# Patient Record
Sex: MALE | Race: WHITE | NOT HISPANIC OR LATINO | Employment: OTHER | URBAN - METROPOLITAN AREA
[De-identification: names, ages, dates, MRNs, and addresses within clinical notes are randomized per-mention and may not be internally consistent; named-entity substitution may affect disease eponyms.]

---

## 2023-03-07 ENCOUNTER — TRANSFERRED RECORDS (OUTPATIENT)
Dept: HEALTH INFORMATION MANAGEMENT | Facility: CLINIC | Age: 63
End: 2023-03-07

## 2024-04-05 ENCOUNTER — TRANSFERRED RECORDS (OUTPATIENT)
Dept: HEALTH INFORMATION MANAGEMENT | Facility: CLINIC | Age: 64
End: 2024-04-05
Payer: COMMERCIAL

## 2024-04-22 ENCOUNTER — MEDICAL CORRESPONDENCE (OUTPATIENT)
Dept: HEALTH INFORMATION MANAGEMENT | Facility: CLINIC | Age: 64
End: 2024-04-22
Payer: COMMERCIAL

## 2024-04-23 ENCOUNTER — TRANSCRIBE ORDERS (OUTPATIENT)
Dept: OTHER | Age: 64
End: 2024-04-23

## 2024-04-23 DIAGNOSIS — G20.A1 PARKINSON'S DISEASE WITHOUT DYSKINESIA OR FLUCTUATING MANIFESTATIONS (H): Primary | ICD-10-CM

## 2024-04-25 NOTE — TELEPHONE ENCOUNTER
RECORDS RECEIVED FROM: external   REASON FOR VISIT: DBS consultation for PD    PROVIDER: Dr. Sven Torres   DATE OF APPT: 5/9/24   NOTES (FOR ALL VISITS) STATUS DETAILS   OFFICE NOTE from referring provider Received Dr Yung Erickson @ [facility]:  4/5/24 2/28/23   OFFICE NOTE from other specialist Received Naveen GARCIA @ [facility]:  10/4/23  4/4/23   MEDICATION LIST Received    IMAGING  (FOR ALL VISITS)     MRI (HEAD, NECK, SPINE) PACS [facility]:  MRI Brain 3/7/23

## 2024-05-08 NOTE — PROGRESS NOTES
Department of Neurology  Movement Disorders Division   New Patient Visit    Patient: Yamil Smith   MRN: [id number]   : 1960   Date of Visit: 2024    CC: Parkinson's disease, DBS referral    HPI:  Yamil Smith is a 64 year old right handed man who presents for consideration of DBS for Parkinson's disease.     Mr. Smith presents with his son, Felix.     He reports that the last time he saw his Parkinson's doctor (Dr. Erickson with [facility]), Dr. Erickson told him that he would be a good candidate for an implant. He has looked into different treatment options including focused ultrasound and is interested in learning more in general. He does not remember why Dr. Erickson felt he would be a good candidate for DBS.     Mr. Smith was diagnosed with Parkinson's disease a little over a year ago. He got insurance with Medicare a year ago. He had some check ups and asked why his hand seemed to be shaky at times and he was referred to Dr. Erickson. He does not remember when his hand started shaking - if he had to guess he would say maybe a couple of years. Tremor is in his right hand - he maybe notices it in his left hand if it's cold out. He does not notice tremor elsewhere in his body - face, voice, legs, head.     Parkinson Disease Motor Symptom Review:  Dyskinesia: Denies  Wearing off:  Prescribed CD/LD TID - currently really only taking 1 tab at 7am, 1 tab at 3pm. Tremor seems better controlled in the morning. He doesn't clearly notice wearing off in the afternoon as he tends to be working then.   Freezing of gait: Yes, occasionally. Maybe more with turning - can come out of nowhere.   Dystonia: Rare toe curling (maybe more cramping in the left leg - toe curling hasn't happened in a long time). Rare right finger curling - none recently.   Tremor: Right hand tremor - more at rest. Comes and goes.   Rigidity: Legs get stiff and sore if he is active - crouching a lot. Hitting the gym has helped with this. He has not  "noticed any asymmetry in stiffness.   Bradykinesia: A neighbor's wife mentioned he seemed to be moving in slow motion a couple of years ago. He has seen some videos of himself where he appears to be moving quite slowly (making a video for his mom). Dexterity is a little off. Handwriting is a little worse - maybe sometimes a little smaller.      Parkinson's Disease Non-motor Symptom Review:  Depression - Mood is generally ok - son agrees. Denies feeling down or anxious.   Anxiety - Occasional work stress, nothing on a day to day.   Cognitive impairment -  He is starting to get more forgetful - he attributes this to his age. His son has no specific concerns about cognition - he is moving, talking and perhaps thinking slower. He denies any functional impairment from cognitive issues.   Sleep disturbances - \"Seems fine.\" No known dream enactment behavior currently. There have been a couple of times in the past he has maybe acted out a bad dream - not in the past couple of years.   GI symptoms - Denies constipation  Urinary symptoms - Sometimes urinary urgency  Balance - \"So-so.\" No falls. He has to be close to somewhere he can hold himself up when lifting legs to put on pants. He has not worked with physical therapy. He was going to go to [facility] Parkinson's disease at the [facility].   Autonomic dysfunction - Occasionally dizziness with quick positional changes - not happening every time he stands. He has also noticed that sometimes when it is cold out, he breaks into a cold sweat.   Hallucinations - Denies hallucinations. Sometimes sees shadows out of the corner of his eyes but typically this is attributable to car driving by or the band for his glasses.   Speech - He has been told that he talks more softly - first pointed him out to him by Dr. Erickson. His son reports that Mr. Smith being a soft talker seems normal to him, he hasn't noticed a change in his father's voice. Mr. Smith has also not noticed a change in " "voice.   Swallowing - Denies dysphagia.   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   Dopamine dysregulation  - Denies any problems with impulse control. Not taking medications excessively.      Driving: Yes - \"My driving isn't the greatest.\" Wife was glad that his son brought him today for rush hour. He attributes to vision issues (he wears reading glasses and has issues with far vision).  Living situation: Wife and son, Felix. Friend lives in the basement.   Medication adherence - frequently misses bedtime dose of CD/LD  ADL's: the patient is independent    He is working as a . 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 His son's wife works for [employer] and deals a lot with the implants.     He does not believe there is any history of Parkinson's disease in the family.     Chews tobacco, no smoking  No alcohol currently - none in the past 6 years. Prior heavier consumption.   No marijuana, THC, CBD  No other drug use.     Related Medications 7am 3pm 9pm   Carbidopa/Levodopa  mg 1 1 1 sometimes   Last taken at this morning around 6:30am.     He denies any side effects from CD/LD.     He has never taken a higher dose of CD/LD. He has only ever been on CD/LD for Parkinson's disease.     ROS:  All others negative except as listed above.    No past medical history on file.     No past surgical history on file.     No current outpatient medications on file.       Not on File     No family history on file.     Social History     Socioeconomic History    Marital status:      Spouse name: Not on file    Number of children: Not on file    Years of education: Not on file    Highest education level: Not on file   Occupational History    Not on file   Tobacco Use    Smoking status: Not on file    Smokeless tobacco: Not on file   Substance and Sexual Activity    Alcohol use: Not on file    Drug use: Not on file    Sexual activity: Not on file "   Other Topics Concern    Not on file   Social History Narrative    Not on file     Social Determinants of Health     Financial Resource Strain: Not on file   Food Insecurity: Not on file   Transportation Needs: Not on file   Physical Activity: Not on file   Stress: Not on file   Social Connections: Not on file   Interpersonal Safety: Not on file   Housing Stability: Not on file        PHYSICAL EXAM:  BP (!) 167/89 (BP Location: Right arm, Patient Position: Sitting, Cuff Size: Adult Large)   Pulse 70   Wt 89.4 kg (197 lb)   SpO2 97%     Gen: alert, active, attentive, appropriately groomed   HEENT: normocephalic, eyes open with no discharge, nares patent, oropharynx clear-no lesions  Chest: normal configuration, chest rise equal b/l, non labored breathing   CV: warm and well perfused  Pulmonary: normal WOB and conversational on RA  Extremities: no clubbing/edema/cyanosis in BUE/BLE  Psych: mood stable     NEURO:  MS: Alert and oriented to person, place, time, and situation.  Speech normal to comprehension and naming.  Recent and remote memory intact.  Attention and concentration normal.  Fund of knowledge normal.    CN:  II: Pupils equal, round, and reactive to light. VFF.  III, IV, VI: EOM normal range, no nystagmus.    V:  Facial sensation intact.  VII: Face symmetric at rest and with activation  VIII: Intact to voice  IX, X: Palate rise b/l, uvula midline.  Soft spoken, no dysarthria.  XI: Trapezius 5/5 bilaterally.  XII: Tongue protrudes midline. No fasciculation or atrophy noted.    Motor:  Normal bulk throughout upper and lower extremities.  See UPDRS for full details of motor assessment.   R/L  Shoulder abd      5/5  Elbow flex  5/5  Elbow ext  5/5     5/5  IO   5/5    Hip flex  5/5  Knee flex  5/5  Knee ext  5/5  Dorsiflex  5/5  Plantar flex  5/5    Reflexes:  R/L  Biceps   2+/2+  Brachioradialis 2+/2+  Patellar  2+/2+  Achilles  1+/1+  Plantar   down/down   No clonus.  No frontal release  signs.    Sensation:  Intact to LT and vibration in all extremities.    Coordination:  FTN and HTN intact bilaterally.    Gait:  Reduced arm swing on right, slightly wide based. En bloc turn. Normal retropulsion.         5/9/2024    10:00 AM   UPDRS Motor Scale   Time: 10:14   Medication On   R Brain DBS: None   L Brain DBS: None   Dyskinesia (LID) No   Did LID interfere No   Speech 1   Facial Expression 2   Rigidity Neck 0   Rigidity RUE 1   Rigidity LUE 1   Rigidity RLE 0   Rigidity LLE 1   Finger Taps R 1   Finger Taps L 1   Hand Mvt R 2   Hand Mvt L 1   Pron-/Supinate R 0   Pron-/Supinate L 0   Toe Tap R 1   Toe Tap L 0   Leg Agility R 0   Leg Agility L 0   Arise From Chair 0   Gait 1   Gait Freezing 0   Postural Stability 0   Posture 3   Global Spont Mvt 2   Postural Tremor RUE 0   Postural Tremor LUE 1   Kinetic Tremor RUE 0   Kinetic Tremor LUE 0   Rest Tremor RUE 2   Rest Tremor LUE 0   Rest Tremor RLE 0   Rest Tremor LLE 0   Rest Tremor Lip/Jaw 0   Rest Tremor Constancy 1   Total Right 7   Total Left 5   Axial Total 9   Total 22       LABS:  CBC with mild thrombocytopenia (115)  BMP unremarkable  HA1C 5.3  TSH 0.71  , HLD 74    IMAGING:    MRI Brain March 2023  Mild cerebral atrophy. The intracranial contents are otherwise negative.   Slight ethmoid paranasal sinus inflammatory change.   - Personally reviewed, agree with interpretation above    ASSESSMENT/PLAN:  Yamil Smith is a 64 year old right handed man who presents for consideration of DBS for Parkinson's disease.         - Reviewed Parkinson's disease  - Reviewed how to take Carbidopa/Levodopa - continue 1 tab Carbidopa/Levodopa TID    - Recommend shortening the interval between doses and taking three doses consistently  - There is room to increase CD/LD as needed  - Advised that at this time, he is too early into his disease course for surgical treatment of Parkinson's disease   - Reviewed deep brain stimulation, focused ultrasound, gamma  knife radiation  - Reviewed the importance of exercise in Parkinson's disease    - Provided list of Parkinson's disease specific exercise resources  - Provided link to PD GENEration    Follow up with Dr. Erickson per patient preference.     Mr. Smith was seen and discussed with neurology attending, Dr. Singer.     Geovanna Schaeffer MD  Movement Disorders Fellow

## 2024-05-09 ENCOUNTER — OFFICE VISIT (OUTPATIENT)
Dept: NEUROLOGY | Facility: CLINIC | Age: 64
End: 2024-05-09
Attending: PSYCHIATRY & NEUROLOGY
Payer: COMMERCIAL

## 2024-05-09 ENCOUNTER — PRE VISIT (OUTPATIENT)
Dept: NEUROLOGY | Facility: CLINIC | Age: 64
End: 2024-05-09

## 2024-05-09 VITALS
HEART RATE: 70 BPM | DIASTOLIC BLOOD PRESSURE: 89 MMHG | SYSTOLIC BLOOD PRESSURE: 167 MMHG | OXYGEN SATURATION: 97 % | WEIGHT: 197 LBS

## 2024-05-09 DIAGNOSIS — G20.A1 PARKINSON'S DISEASE WITHOUT DYSKINESIA OR FLUCTUATING MANIFESTATIONS (H): Primary | ICD-10-CM

## 2024-05-09 PROCEDURE — 99205 OFFICE O/P NEW HI 60 MIN: CPT | Mod: GC | Performed by: STUDENT IN AN ORGANIZED HEALTH CARE EDUCATION/TRAINING PROGRAM

## 2024-05-09 RX ORDER — CARBIDOPA AND LEVODOPA 25; 100 MG/1; MG/1
1 TABLET ORAL 3 TIMES DAILY
COMMUNITY

## 2024-05-09 RX ORDER — MULTIVITAMIN,THER AND MINERALS
1 TABLET ORAL DAILY
COMMUNITY

## 2024-05-09 RX ORDER — ACETAMINOPHEN 500 MG
1000 TABLET ORAL EVERY 6 HOURS PRN
COMMUNITY

## 2024-05-09 ASSESSMENT — UNIFIED PARKINSONS DISEASE RATING SCALE (UPDRS)
FREEZING_GAIT: (0) NORMAL: NO FREEZING.
SPONTANEITY_OF_MOVEMENT: (2) MILD: MILD GLOBAL SLOWNESS AND POVERTY OF SPONTANEOUS MOVEMENTS.
TOTAL_SCORE: 7
PRONATION_SUPINATION_LEFT: (0) NORMAL: NO PROBLEMS.
FACIAL_EXPRESSION: (2) MILD: IN ADDITION TO DECREASED EYE-BLINK FREQUENCY, MASKED FACIES PRESENT IN THE LOWER FACE AS WELL, NAMELY FEWER MOVEMENTS AROUND THE MOUTH, SUCH AS LESS SPONTANEOUS SMILING, BUT LIPS NOT PARTED.
RIGIDITY_RLE: (0) NORMAL: NO RIGIDITY.
GAIT: (1) SLIGHT: INDEPENDENT WALKING WITH MINOR GAIT IMPAIRMENT.
TOETAPPING_RIGHT: (1) SLIGHT: ANY OF THE FOLLOWING: A) THE REGULAR RHYTHM IS BROKEN WITH ONE WITH ONE OR TWO INTERRUPTIONS OR HESITATIONS OF THE MOVEMENT  B) SLIGHT SLOWING  C) THE AMPLITUDE DECREMENTS NEAR THE END OF THE 10 MOVEMENTS.
TOTAL_SCORE_LEFT: 5
RIGIDITY_NECK: (0) NORMAL: NO RIGIDITY.
POSTURE: (3) MODERATE: STOOPED POSTURE, SCOLIOSIS, OR LEANING TO ONE SIDE THAT CANNOT BE CORRECTED VOLITIONALLY TO A NORMAL POSTURE BY THE PATIENT.
RIGIDITY_LUE: (1) SLIGHT: RIGIDITY ONLY DETECTED WITH ACTIVATION MANEUVER.
AMPLITUDE_LIP_JAW: (0) NORMAL: NO TREMOR.
POSTURAL_STABILITY: (0) NORMAL: NO PROBLEMS. RECOVERS WITH ONE OR TWO STEPS.
MOVEMENTS_INTERFERE_WITH_RATINGS: NO
PRONATION_SUPINATION_RIGHT: (0) NORMAL: NO PROBLEMS.
LEG_AGILITY_RIGHT: (0) NORMAL: NO PROBLEMS.
RIGIDITY_RUE: (1) SLIGHT: RIGIDITY ONLY DETECTED WITH ACTIVATION MANEUVER.
AXIAL_SCORE: 9
TOETAPPING_LEFT: (0) NORMAL: NO PROBLEMS.
FINGER_TAPPING_LEFT: (1) SLIGHT: ANY OF THE FOLLOWING: A) THE REGULAR RHYTHM IS BROKEN WITH ONE WITH ONE OR TWO INTERRUPTIONS OR HESITATIONS OF THE MOVEMENT  B) SLIGHT SLOWING  C) THE AMPLITUDE DECREMENTS NEAR THE END OF THE 10 MOVEMENTS.
CONSTANCY_TREMOR_ATREST: (1) SLIGHT: TREMOR AT REST IS PRESENT 25% OF THE ENTIRE EXAMINATION PERIOD.
AMPLITUDE_RLE: (0) NORMAL: NO TREMOR.
RIGIDITY_LLE: (1) SLIGHT: RIGIDITY ONLY DETECTED WITH ACTIVATION MANEUVER.
TOTAL_SCORE: 22
PARKINSONS_MEDS: ON
ARISING_CHAIR: (0) NORMAL: NO PROBLEMS. ABLE TO ARISE QUICKLY WITHOUT HESITATION.
HANDMOVEMENTS_LEFT: (1) SLIGHT: ANY OF THE FOLLOWING: A) THE REGULAR RHYTHM IS BROKEN WITH ONE WITH ONE OR TWO INTERRUPTIONS OR HESITATIONS OF THE MOVEMENT  B) SLIGHT SLOWING  C) THE AMPLITUDE DECREMENTS NEAR THE END OF THE 10 MOVEMENTS.
FINGER_TAPPING_RIGHT: (1) SLIGHT: ANY OF THE FOLLOWING: A) THE REGULAR RHYTHM IS BROKEN WITH ONE WITH ONE OR TWO INTERRUPTIONS OR HESITATIONS OF THE MOVEMENT  B) SLIGHT SLOWING  C) THE AMPLITUDE DECREMENTS NEAR THE END OF THE 10 MOVEMENTS.
AMPLITUDE_RUE: (2) MILD: > 1 CM BUT < 3 CM IN MAXIMAL AMPLITUDE.
AMPLITUDE_LUE: (0) NORMAL: NO TREMOR.
DYSKINESIAS_PRESENT: NO
LEG_AGILITY_LEFT: (0) NORMAL: NO PROBLEMS.
SPEECH: (1) SLIGHT: LOSS OF MODULATION, DICTION OR VOLUME, BUT STILL ALL WORDS EASY TO UNDERSTAND.
HANDMOVEMENTS_RIGHT: (2) MILD: ANY OF THE FOLLOWING: A) 3 TO 5 INTERRUPTIONS DURING TAPPING  B) MILD SLOWING  C) THE AMPLITUDE DECREMENTS MIDWAY IN THE 10-MOVEMENT SEQUENCE.
AMPLITUDE_LLE: (0) NORMAL: NO TREMOR.

## 2024-05-09 NOTE — PATIENT INSTRUCTIONS
We agree with the diagnosis of Parkinson's disease. You are on a low dose of Carbidopa/Levodopa and your exam looks good today. We expect Carbidopa/Levodopa to last in your body around 4-5 hours. If you go longer between doses, you may notice wearing off of symptoms. We generally recommend starting this medication as three times a day - usually in the morning, around lunch and around dinner. Taking this medication more consistently may help improve your symptoms. Especially early in the disease, people often don't need the medication overnight. If Parkinson's disease symptoms start to interfere with symptoms, there are options to add at bedtime.     We recommend taking your 1 tablet of Carbidopa/Levodopa three times a day. If you are ok with how your symptoms are controlled currently, you do not need to change the timing. However, you may benefit from taking 1 tablet before breakfast, lunch and dinner to get it more consistently in your system. There is room to increase the amount of medication with each dose if you feel that your symptoms are not controlled well enough.     We consider surgical treatment (Deep Brain Stimulation, focus ultrasound, gamma knife radiotherapy) when people have Parkinson's disease symptoms that are not controlled well enough on medications or who have side effects from medication. At this time, there are other changes that we would recommend making before considering surgical treatment. These are options to consider in the future if needed.     Exercise is very important for Parkinson's disease. There have been studies that show that exercise helps slow and improve symptoms of Parkinson's disease. Aerobic exercise is especially important. We have provided a list of exercise resources for folks with Parkinson's disease below.     In general, what is good for the heart is good for the brain. Eating a healthy diet (Mediterranean - lots of fruits and vegetables, limiting red meat) is  generally recommended to help prevent wear and tear over time. There is no specific diet we recommend for Parkinson's disease we recommend.     Parkinson's disease genetic project: PDGENEration  https://www.parkinson.org/advancing-research/our-research/pdgeneration    You can follow up with Dr. Erickson. Please reach out to our clinic with any questions or concerns in the future.     Parkinson's Disease Exercise Resources:    Katherine Chapman exercise class and support group (currently online): [phone] or [email or web address].    Https://www.E-[email or web address]/    Https://www.[email or web address]    https://www.apdaparkinson.org/community/[place]/local-resources-support/    Https://www.apdaparWHI Solutionon.org/community/[place]/    Https://www.ocf5ntqs.org/    PMD [place] Exercise Groups by [facility]  https://www.pmdalliance.org/resources/exercise-groups/[place]/    The Boxing club: Knock out Parkinson's boxing program    [place] - [phone]  [facility] - [phone]  [facility] [phone]  [place] - [phone]    [employer]  [email or web address]://[email or web address]-directory  Call [phone]    [employer]  https://tools.[employer]/    [employer] Listing of Events  https://www.apdaparkinson.org/upcoming-events/?eType=EmailBlastContent&bAr=[email or web address]    [employer] Events  https://[email or web address]/events/    Dance for Parkinsons  https://danceforLifeshare [email or web address]/resources/dance-at-home    Seated Strength and Cardio  Https://www.iRewindube.com/channel/JM27V3XpwRugEUECse7nQOue/feed    Sathya Chi  Https://www.iRewindube.com/channel/SD49L8JgwNkuIQEWtr6yLFfp/feed    Seated Exercises for Parkinson's  https://www.workingonwellnessfoundation.org/  Monday is Yoga. Tuesday is General Exercise. Wednesday is Strength Training. Thursday is Range-of-Motion.    Call the Parkinson's Foundation Helpline 6.797.4PD.INFO (1-453.476.7510) for  exercise resources in your area.     Free ebook about PD

## 2024-05-09 NOTE — LETTER
2024       RE: Yamil Smith  [address]  [facility] [address]     Dear Colleague,    Thank you for referring your patient, aYmil Smith, to the [facility]UROLOG[facility] [place] at [facility]. Please see a copy of my visit note below.    Department of Neurology  Movement Disorders Division   New Patient Visit    Patient: Yamil Smith   MRN: [id number]   : 1960   Date of Visit: 2024    CC: Parkinson's disease, DBS referral    HPI:  Yamil Smith is a 64 year old right handed man who presents for consideration of DBS for Parkinson's disease.     Mr. Smith presents with his son, Felix.     He reports that the last time he saw his Parkinson's doctor (Dr. Erickson with [facility]), Dr. Erickson told him that he would be a good candidate for an implant. He has looked into different treatment options including focused ultrasound and is interested in learning more in general. He does not remember why Dr. Erickson felt he would be a good candidate for DBS.     Mr. Smith was diagnosed with Parkinson's disease a little over a year ago. He got insurance with Medicare a year ago. He had some check ups and asked why his hand seemed to be shaky at times and he was referred to Dr. Erickson. He does not remember when his hand started shaking - if he had to guess he would say maybe a couple of years. Tremor is in his right hand - he maybe notices it in his left hand if it's cold out. He does not notice tremor elsewhere in his body - face, voice, legs, head.     Parkinson Disease Motor Symptom Review:  Dyskinesia: Denies  Wearing off:  Prescribed CD/LD TID - currently really only taking 1 tab at 7am, 1 tab at 3pm. Tremor seems better controlled in the morning. He doesn't clearly notice wearing off in the afternoon as he tends to be working then.   Freezing of gait: Yes, occasionally. Maybe more with turning - can come out of nowhere.   Dystonia: Rare  "toe curling (maybe more cramping in the left leg - toe curling hasn't happened in a long time). Rare right finger curling - none recently.   Tremor: Right hand tremor - more at rest. Comes and goes.   Rigidity: Legs get stiff and sore if he is active - crouching a lot. Hitting the gym has helped with this. He has not noticed any asymmetry in stiffness.   Bradykinesia: A neighbor's wife mentioned he seemed to be moving in slow motion a couple of years ago. He has seen some videos of himself where he appears to be moving quite slowly (making a video for his mom). Dexterity is a little off. Handwriting is a little worse - maybe sometimes a little smaller.      Parkinson's Disease Non-motor Symptom Review:  Depression - Mood is generally ok - son agrees. Denies feeling down or anxious.   Anxiety - Occasional work stress, nothing on a day to day.   Cognitive impairment -  He is starting to get more forgetful - he attributes this to his age. His son has no specific concerns about cognition - he is moving, talking and perhaps thinking slower. He denies any functional impairment from cognitive issues.   Sleep disturbances - \"Seems fine.\" No known dream enactment behavior currently. There have been a couple of times in the past he has maybe acted out a bad dream - not in the past couple of years.   GI symptoms - Denies constipation  Urinary symptoms - Sometimes urinary urgency  Balance - \"So-so.\" No falls. He has to be close to somewhere he can hold himself up when lifting legs to put on pants. He has not worked with physical therapy. He was going to go to [facility]'s [facility] at the [facility].   Autonomic dysfunction - Occasionally dizziness with quick positional changes - not happening every time he stands. He has also noticed that sometimes when it is cold out, he breaks into a cold sweat.   Hallucinations - Denies hallucinations. Sometimes sees shadows out of the corner of his eyes but typically this is " "attributable to car driving by or the band for his glasses.   Speech - He has been told that he talks more softly - first pointed him out to him by Dr. Erickson. His son reports that Mr. Smith being a soft talker seems normal to him, he hasn't noticed a change in his father's voice. Mr. Smith has also not noticed a change in voice.   Swallowing - Denies dysphagia.   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   Dopamine dysregulation  - Denies any problems with impulse control. Not taking medications excessively.      Driving: Yes - \"My driving isn't the greatest.\" Wife was glad that his son brought him today for rush hour. He attributes to vision issues (he wears reading glasses and has issues with far vision).  Living situation: Wife and son, Felix. Friend lives in the basement.   Medication adherence - frequently misses bedtime dose of CD/LD  ADL's: the patient is independent    He is working as a . 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 His son's wife works for [employer] and deals a lot with the implants.     He does not believe there is any history of Parkinson's disease in the family.     Chews tobacco, no smoking  No alcohol currently - none in the past 6 years. Prior heavier consumption.   No marijuana, THC, CBD  No other drug use.     Related Medications 7am 3pm 9pm   Carbidopa/Levodopa  mg 1 1 1 sometimes   Last taken at this morning around 6:30am.     He denies any side effects from CD/LD.     He has never taken a higher dose of CD/LD. He has only ever been on CD/LD for Parkinson's disease.     ROS:  All others negative except as listed above.    No past medical history on file.     No past surgical history on file.     No current outpatient medications on file.       Not on File     No family history on file.     Social History     Socioeconomic History    Marital status:      Spouse name: Not on file    Number of " children: Not on file    Years of education: Not on file    Highest education level: Not on file   Occupational History    Not on file   Tobacco Use    Smoking status: Not on file    Smokeless tobacco: Not on file   Substance and Sexual Activity    Alcohol use: Not on file    Drug use: Not on file    Sexual activity: Not on file   Other Topics Concern    Not on file   Social History Narrative    Not on file     Social Determinants of Health     Financial Resource Strain: Not on file   Food Insecurity: Not on file   Transportation Needs: Not on file   Physical Activity: Not on file   Stress: Not on file   Social Connections: Not on file   Interpersonal Safety: Not on file   Housing Stability: Not on file        PHYSICAL EXAM:  BP (!) 167/89 (BP Location: Right arm, Patient Position: Sitting, Cuff Size: Adult Large)   Pulse 70   Wt 89.4 kg (197 lb)   SpO2 97%     Gen: alert, active, attentive, appropriately groomed   HEENT: normocephalic, eyes open with no discharge, nares patent, oropharynx clear-no lesions  Chest: normal configuration, chest rise equal b/l, non labored breathing   CV: warm and well perfused  Pulmonary: normal WOB and conversational on RA  Extremities: no clubbing/edema/cyanosis in BUE/BLE  Psych: mood stable     NEURO:  MS: Alert and oriented to person, place, time, and situation.  Speech normal to comprehension and naming.  Recent and remote memory intact.  Attention and concentration normal.  Fund of knowledge normal.    CN:  II: Pupils equal, round, and reactive to light. VFF.  III, IV, VI: EOM normal range, no nystagmus.    V:  Facial sensation intact.  VII: Face symmetric at rest and with activation  VIII: Intact to voice  IX, X: Palate rise b/l, uvula midline.  Soft spoken, no dysarthria.  XI: Trapezius 5/5 bilaterally.  XII: Tongue protrudes midline. No fasciculation or atrophy noted.    Motor:  Normal bulk throughout upper and lower extremities.  See UPDRS for full details of motor  assessment.   R/L  Shoulder abd      5/5  Elbow flex  5/5  Elbow ext  5/5     5/5  IO   5/5    Hip flex  5/5  Knee flex  5/5  Knee ext  5/5  Dorsiflex  5/5  Plantar flex  5/5    Reflexes:  R/L  Biceps   2+/2+  Brachioradialis 2+/2+  Patellar  2+/2+  Achilles  1+/1+  Plantar   down/down   No clonus.  No frontal release signs.    Sensation:  Intact to LT and vibration in all extremities.    Coordination:  FTN and HTN intact bilaterally.    Gait:  Reduced arm swing on right, slightly wide based. En bloc turn. Normal retropulsion.         5/9/2024    10:00 AM   UPDRS Motor Scale   Time: 10:14   Medication On   R Brain DBS: None   L Brain DBS: None   Dyskinesia (LID) No   Did LID interfere No   Speech 1   Facial Expression 2   Rigidity Neck 0   Rigidity RUE 1   Rigidity LUE 1   Rigidity RLE 0   Rigidity LLE 1   Finger Taps R 1   Finger Taps L 1   Hand Mvt R 2   Hand Mvt L 1   Pron-/Supinate R 0   Pron-/Supinate L 0   Toe Tap R 1   Toe Tap L 0   Leg Agility R 0   Leg Agility L 0   Arise From Chair 0   Gait 1   Gait Freezing 0   Postural Stability 0   Posture 3   Global Spont Mvt 2   Postural Tremor RUE 0   Postural Tremor LUE 1   Kinetic Tremor RUE 0   Kinetic Tremor LUE 0   Rest Tremor RUE 2   Rest Tremor LUE 0   Rest Tremor RLE 0   Rest Tremor LLE 0   Rest Tremor Lip/Jaw 0   Rest Tremor Constancy 1   Total Right 7   Total Left 5   Axial Total 9   Total 22     LABS:  CBC with mild thrombocytopenia (115)  BMP unremarkable  HA1C 5.3  TSH 0.71  , HLD 74    IMAGING:    MRI Brain March 2023  Mild cerebral atrophy. The intracranial contents are otherwise negative.   Slight ethmoid paranasal sinus inflammatory change.   - Personally reviewed, agree with interpretation above    ASSESSMENT/PLAN:  Yamil Smith is a 64 year old right handed man who presents for consideration of DBS for Parkinson's disease.     - Reviewed Parkinson's disease  - Reviewed how to take Carbidopa/Levodopa - continue 1 tab  Carbidopa/Levodopa TID    - Recommend shortening the interval between doses and taking three doses consistently  - There is room to increase CD/LD as needed  - Advised that at this time, he is too early into his disease course for surgical treatment of Parkinson's disease   - Reviewed deep brain stimulation, focused ultrasound, gamma knife radiation  - Reviewed the importance of exercise in Parkinson's disease    - Provided list of Parkinson's disease specific exercise resources  - Provided link to PD GENEration    Follow up with Dr. Erickson per patient preference.     Mr. Smith was seen and discussed with neurology attending, Dr. Singer.     Geovanna Schaeffer MD  Movement Disorders Fellow      Again, thank you for allowing me to participate in the care of your patient.      Sincerely,    Sven Torres MD

## 2024-11-05 ENCOUNTER — TELEPHONE (OUTPATIENT)
Dept: NEUROLOGY | Facility: CLINIC | Age: 64
End: 2024-11-05
Payer: COMMERCIAL

## 2024-11-05 NOTE — TELEPHONE ENCOUNTER
The patient was called and offered 11/15/24 at 8:30 AM but the patient declined this. The patient requested for an appointment on 12/26/24 with Dr. Singer. The patient was scheduled on 12/26/24 at 8 AM with Dr. Singer, to follow up on regarding Deep Brain Stimulation. The patient is aware this appointment is in [place], at the [facility], 3rd Floor.

## 2024-11-05 NOTE — TELEPHONE ENCOUNTER
[facility] Call Center    Phone Message    May a detailed message be left on voicemail: yes     Reason for Call: Other:       Patient calling to schedule follow up with Dr Yg Torres for DBS consult.   Writer unable to schedule return movement disorder appointment with Dr Yg Torres as no template was available.   Sending TE to clinic to review and assist with scheduling, call back #[phone]    Action Taken: Message routed to:  [facility] ([facility] Neurology    Travel Screening: Not Applicable

## 2024-12-20 NOTE — PROGRESS NOTES
Department of Neurology  Movement Disorders Division  Follow-up Patient Visit    Patient: Yamil Smith  MRN: [id number]  : 1960  Date of Visit: 2024  PCP: Richard Ohara  Referring Provider: Yung Erickson MD  [facility]UROLOG[facility]  [address] [place] [id number]    CC: Parkinson's Disease    HPI:  Yamil Smith is a 64yr old R-handed male who presents for follow-up of Parkinson's Disease. He is accompanied by his wife and son    Symptom onset onset was a couple of years ago when the patient noticed a tremor in his R hand. He went onto Medicare in  and was thereafter seen by Dr Erickson at [facility] where he was ultimately diagnosed with Parkinson's Disease. He was then referred to [facility] in 2024 for consideration of DBS, though at that initial appointment he was thought to be too early in his disease course to warrant brain surgery     INTERVAL EVENTS:  The patient was last seen on 2024, at which time the patient was endorsing fairly good control of his PD symptoms and so continued on his CD/LD (25-100mg) 1 tab TID regimen. Initial plan was for continued follow-up with his prior physician - Dr Erickson at [facility] - but he returns to clinic today for ongoing management    Today, the patient reports he feels his symptoms have been getting a bit worse. He describes this as stiffness and pain in his legs. He also feels that his walking has gotten worse - more shuffling - with daily gait freezing. He has also started falling, falling down ~4x in a 6wk period about 1mo ago. Most of these are due to freezing. He's been trying to compensate for this by taking his time when starting his walking. He does not use a walking assist device    The patient reports that he has been noticing wearing off of his medications at around the 4hr richard. When he's OFF, he feels his legs are more stiff and his tremor will also get worse    Tremor in the bilateral (R>L) hand that is somewhat aggravated by  cold    Hx of toe curling, but is quite intermittent. May have rare R finger curling as well and feels that his legs often get stiff. This is improved by increased activity. He was previously in [facility] for [facility]'s [facility] at the [facility]    Voice has been getting softer, but still easily understandable    Sleep is ok, but is interrupted by nocturia. Does have dream enactment which has been present for many years. Occurs ~1-2x per mo    Has also been noticing issues with temperature control, saying that he will sometimes not sweat as much in hot places and also will have cold sweats when it dips below freezing. He will occasionally get dizzy upon standing    Feels that he is more forgetful but no more overt concerns from patient or family. No concern for hallucinations    MEDICATIONS:  Pertinent Movement Medications   7AM 3PM 9PM    CD/LD (25-100mg) 1 1 1                       PHYSICAL EXAM:  BP (!) 180/92 (BP Location: Right arm, Patient Position: Sitting, Cuff Size: Adult Large)   Pulse 79   Wt 93 kg (205 lb)   SpO2 99%         5/9/2024    10:00 AM 12/26/2024     8:00 AM   UPDRS Motor Scale   Time: 10:14 08:52   Medication On On   R Brain DBS: None None   L Brain DBS: None None   Dyskinesia (LID) No No   Did LID interfere No    Speech 1 1   Facial Expression 2 2   Rigidity Neck 0 0   Rigidity RUE 1 1   Rigidity LUE 1 1   Rigidity RLE 0 1   Rigidity LLE 1 1   Finger Taps R 1 1   Finger Taps L 1 1   Hand Mvt R 2 1   Hand Mvt L 1 1   Pron-/Supinate R 0 1   Pron-/Supinate L 0 1   Toe Tap R 1 0   Toe Tap L 0 0   Leg Agility R 0 0   Leg Agility L 0 0   Arise From Chair 0 0   Gait 1 0   Gait Freezing 0 1   Postural Stability 0 0   Posture 3 1   Global Spont Mvt 2 1   Postural Tremor RUE 0 0   Postural Tremor LUE 1 0   Kinetic Tremor RUE 0 0   Kinetic Tremor LUE 0 0   Rest Tremor RUE 2 1   Rest Tremor LUE 0 0   Rest Tremor RLE 0 0   Rest Tremor LLE 0 0   Rest Tremor Lip/Jaw 0 0   Rest Tremor Constancy 1 1   Total  Right 7 6   Total Left 5 5   Axial Total 9 6   Total 22 18       DATA:  MRI Brain (3/7/2023)  IMPRESSION:  Mild cerebral atrophy. The intracranial contents are otherwise negative.   Slight ethmoid paranasal sinus inflammatory change.    ASSESSMENT:  Yamil Smith is a 64yr old R-handed male who presents for follow-up of Parkinson's Disease. Today he is reporting issues mostly with wearing off - around 4hrs after his last dose manifesting with stiffness and some tremor - and also gait freezing. It is unclear if the latter is responsive to CD/LD or not. Still, I would recommend increasing the frequency of the CD/LD dose to Q4h and payign more attention to which symptoms are improved with CD/LD and which are not. I also recommend that he begin PT for his gait and balance. I also provided information to the patient about Orthostatic Hypotension and recommend that his family keep a close eye on both dizziness as well as dream enactment behavior    PLAN:  - Increase Carbidopa/Levodopa 1 tab four times per day (ie every 4 hours). It will look like this:  New Meds 7AM 11AM 3PM 7PM   CD/LD (25-100mg) 1 1 1  1   - PT referral to work on walking/balance  - Try to pay close attention to which of your symptoms respond to Carbidopa/Levodopa and which do not  - Aerobic Exercises are very important for Parkinson's Disease. I would recommend that you try and exercise on a daily basis   - Let us know via a [employer] message if you'd be interested in the PD Exercise groups (ex Rock Steady Boxing, Ping-Pong for Parkinson's, etc) and we can send you some information. Or, you should be able to find it online  - Continue to pay close attention to acting out your dreams at night as this could be a sign of REM Sleep Behavior Disorder (RBD). If this gets more frequent or severe, let us know and we may start to treat  - Continue to pay close attention to dizziness. I've provided some information on your AVS, but keep us updated as well  - I  have also given you information on constipation should you need it  - Follow-up in 6mo    This patient was seen with Movement Disorder Specialist, Dr Singer, who agrees with the above plan    Janeth Jett MD  Movement Disorder Fellow

## 2024-12-26 ENCOUNTER — OFFICE VISIT (OUTPATIENT)
Dept: NEUROLOGY | Facility: CLINIC | Age: 64
End: 2024-12-26
Payer: COMMERCIAL

## 2024-12-26 VITALS
OXYGEN SATURATION: 99 % | DIASTOLIC BLOOD PRESSURE: 92 MMHG | SYSTOLIC BLOOD PRESSURE: 180 MMHG | WEIGHT: 205 LBS | HEART RATE: 79 BPM

## 2024-12-26 DIAGNOSIS — G20.A1 PARKINSON'S DISEASE WITHOUT DYSKINESIA OR FLUCTUATING MANIFESTATIONS (H): Primary | ICD-10-CM

## 2024-12-26 PROCEDURE — G2211 COMPLEX E/M VISIT ADD ON: HCPCS | Performed by: PSYCHIATRY & NEUROLOGY

## 2024-12-26 PROCEDURE — 99215 OFFICE O/P EST HI 40 MIN: CPT | Mod: GC | Performed by: PSYCHIATRY & NEUROLOGY

## 2024-12-26 RX ORDER — CARBIDOPA AND LEVODOPA 25; 100 MG/1; MG/1
1 TABLET ORAL
Qty: 450 TABLET | Refills: 3 | Status: SHIPPED | OUTPATIENT
Start: 2024-12-26 | End: 2025-12-26

## 2024-12-26 ASSESSMENT — UNIFIED PARKINSONS DISEASE RATING SCALE (UPDRS)
RIGIDITY_LLE: (1) SLIGHT: RIGIDITY ONLY DETECTED WITH ACTIVATION MANEUVER.
GAIT: (0) NORMAL: NO PROBLEMS.
SPEECH: (1) SLIGHT: LOSS OF MODULATION, DICTION OR VOLUME, BUT STILL ALL WORDS EASY TO UNDERSTAND.
AMPLITUDE_LLE: (0) NORMAL: NO TREMOR.
AMPLITUDE_LUE: (0) NORMAL: NO TREMOR.
SPONTANEITY_OF_MOVEMENT: (1) SLIGHT: SLIGHT GLOBAL SLOWNESS AND POVERTY OF SPONTANEOUS MOVEMENTS.
HANDMOVEMENTS_LEFT: (1) SLIGHT: ANY OF THE FOLLOWING: A) THE REGULAR RHYTHM IS BROKEN WITH ONE WITH ONE OR TWO INTERRUPTIONS OR HESITATIONS OF THE MOVEMENT  B) SLIGHT SLOWING  C) THE AMPLITUDE DECREMENTS NEAR THE END OF THE 10 MOVEMENTS.
TOTAL_SCORE: 18
FACIAL_EXPRESSION: (2) MILD: IN ADDITION TO DECREASED EYE-BLINK FREQUENCY, MASKED FACIES PRESENT IN THE LOWER FACE AS WELL, NAMELY FEWER MOVEMENTS AROUND THE MOUTH, SUCH AS LESS SPONTANEOUS SMILING, BUT LIPS NOT PARTED.
FREEZING_GAIT: (1) SLIGHT: FREEZES ON STARTING, TURNING, OR WALKING THROUGH DOORWAY WITH A SINGLE HALT DURING ANY OF THESE EVENTS, BUT THEN CONTINUES SMOOTHLY WITHOUT FREEZING DURING STRAIGHT WALKING.
FINGER_TAPPING_LEFT: (1) SLIGHT: ANY OF THE FOLLOWING: A) THE REGULAR RHYTHM IS BROKEN WITH ONE WITH ONE OR TWO INTERRUPTIONS OR HESITATIONS OF THE MOVEMENT  B) SLIGHT SLOWING  C) THE AMPLITUDE DECREMENTS NEAR THE END OF THE 10 MOVEMENTS.
PARKINSONS_MEDS: ON
PRONATION_SUPINATION_LEFT: (1) SLIGHT: ANY OF THE FOLLOWING: A) THE REGULAR RHYTHM IS BROKEN WITH ONE WITH ONE OR TWO INTERRUPTIONS OR HESITATIONS OF THE MOVEMENT  B) SLIGHT SLOWING  C) THE AMPLITUDE DECREMENTS NEAR THE END OF THE 10 MOVEMENTS.
RIGIDITY_NECK: (0) NORMAL: NO RIGIDITY.
DYSKINESIAS_PRESENT: NO
AMPLITUDE_LIP_JAW: (0) NORMAL: NO TREMOR.
TOTAL_SCORE_LEFT: 5
PRONATION_SUPINATION_RIGHT: (1) SLIGHT: ANY OF THE FOLLOWING: A) THE REGULAR RHYTHM IS BROKEN WITH ONE WITH ONE OR TWO INTERRUPTIONS OR HESITATIONS OF THE MOVEMENT  B) SLIGHT SLOWING  C) THE AMPLITUDE DECREMENTS NEAR THE END OF THE 10 MOVEMENTS.
TOETAPPING_LEFT: (0) NORMAL: NO PROBLEMS.
ARISING_CHAIR: (0) NORMAL: NO PROBLEMS. ABLE TO ARISE QUICKLY WITHOUT HESITATION.
TOTAL_SCORE: 6
RIGIDITY_RLE: (1) SLIGHT: RIGIDITY ONLY DETECTED WITH ACTIVATION MANEUVER.
POSTURE: (1) SLIGHT: NOT QUITE ERECT, BUT COULD BE NORMAL FOR OLDER PERSONS.
POSTURAL_STABILITY: (0) NORMAL: NO PROBLEMS. RECOVERS WITH ONE OR TWO STEPS.
RIGIDITY_LUE: (1) SLIGHT: RIGIDITY ONLY DETECTED WITH ACTIVATION MANEUVER.
LEG_AGILITY_RIGHT: (0) NORMAL: NO PROBLEMS.
TOETAPPING_RIGHT: (0) NORMAL: NO PROBLEMS.
AMPLITUDE_RLE: (0) NORMAL: NO TREMOR.
AMPLITUDE_RUE: (1) SLIGHT: < 1 CM IN MAXIMAL AMPLITUDE.
RIGIDITY_RUE: (1) SLIGHT: RIGIDITY ONLY DETECTED WITH ACTIVATION MANEUVER.
FINGER_TAPPING_RIGHT: (1) SLIGHT: ANY OF THE FOLLOWING: A) THE REGULAR RHYTHM IS BROKEN WITH ONE WITH ONE OR TWO INTERRUPTIONS OR HESITATIONS OF THE MOVEMENT  B) SLIGHT SLOWING  C) THE AMPLITUDE DECREMENTS NEAR THE END OF THE 10 MOVEMENTS.
CONSTANCY_TREMOR_ATREST: (1) SLIGHT: TREMOR AT REST IS PRESENT 25% OF THE ENTIRE EXAMINATION PERIOD.
HANDMOVEMENTS_RIGHT: (1) SLIGHT: ANY OF THE FOLLOWING: A) THE REGULAR RHYTHM IS BROKEN WITH ONE WITH ONE OR TWO INTERRUPTIONS OR HESITATIONS OF THE MOVEMENT  B) SLIGHT SLOWING  C) THE AMPLITUDE DECREMENTS NEAR THE END OF THE 10 MOVEMENTS.
AXIAL_SCORE: 6
LEG_AGILITY_LEFT: (0) NORMAL: NO PROBLEMS.

## 2024-12-26 NOTE — LETTER
2024       RE: Yamil Smith  [address]  [place] [address]     Dear Colleague,    Thank you for referring your patient, Yamil Smith, to the [facility] NEUROLOGY CLINIC [place] at [facility]. Please see a copy of my visit note below.    Department of Neurology  Movement Disorders Division  Follow-up Patient Visit    Patient: Yamil Smith  MRN: [id number]  : 1960  Date of Visit: 2024  PCP: Richard Ohara  Referring Provider: Yung Erickson MD  [facility] NEUROLOGICAL CLINIC  [address] [place] [address]    CC: Parkinson's Disease    HPI:  Yamil Smith is a 64yr old R-handed male who presents for follow-up of Parkinson's Disease. He is accompanied by his wife and son    Symptom onset onset was a couple of years ago when the patient noticed a tremor in his R hand. He went onto Medicare in  and was thereafter seen by Dr Ercikson at [facility] where he was ultimately diagnosed with Parkinson's Disease. He was then referred to [facility] in 2024 for consideration of DBS, though at that initial appointment he was thought to be too early in his disease course to warrant brain surgery     INTERVAL EVENTS:  The patient was last seen on 2024, at which time the patient was endorsing fairly good control of his PD symptoms and so continued on his CD/LD (25-100mg) 1 tab TID regimen. Initial plan was for continued follow-up with his prior physician - Dr Erickson at [facility] - but he returns to clinic today for ongoing management    Today, the patient reports he feels his symptoms have been getting a bit worse. He describes this as stiffness and pain in his legs. He also feels that his walking has gotten worse - more shuffling - with daily gait freezing. He has also started falling, falling down ~4x in a 6wk period about 1mo ago. Most of these are due to freezing. He's been trying to compensate for this by taking his time when  starting his walking. He does not use a walking assist device    The patient reports that he has been noticing wearing off of his medications at around the 4hr sparkle. When he's OFF, he feels his legs are more stiff and his tremor will also get worse    Tremor in the bilateral (R>L) hand that is somewhat aggravated by cold    Hx of toe curling, but is quite intermittent. May have rare R finger curling as well and feels that his legs often get stiff. This is improved by increased activity. He was previously in Peddling for Parkinson's Disease at the [facility]    Voice has been getting softer, but still easily understandable    Sleep is ok, but is interrupted by nocturia. Does have dream enactment which has been present for many years. Occurs ~1-2x per mo    Has also been noticing issues with temperature control, saying that he will sometimes not sweat as much in hot places and also will have cold sweats when it dips below freezing. He will occasionally get dizzy upon standing    Feels that he is more forgetful but no more overt concerns from patient or family. No concern for hallucinations    MEDICATIONS:  Pertinent Movement Medications   7AM 3PM 9PM    CD/LD (25-100mg) 1 1 1                       PHYSICAL EXAM:  BP (!) 180/92 (BP Location: Right arm, Patient Position: Sitting, Cuff Size: Adult Large)   Pulse 79   Wt 93 kg (205 lb)   SpO2 99%         5/9/2024    10:00 AM 12/26/2024     8:00 AM   UPDRS Motor Scale   Time: 10:14 08:52   Medication On On   R Brain DBS: None None   L Brain DBS: None None   Dyskinesia (LID) No No   Did LID interfere No    Speech 1 1   Facial Expression 2 2   Rigidity Neck 0 0   Rigidity RUE 1 1   Rigidity LUE 1 1   Rigidity RLE 0 1   Rigidity LLE 1 1   Finger Taps R 1 1   Finger Taps L 1 1   Hand Mvt R 2 1   Hand Mvt L 1 1   Pron-/Supinate R 0 1   Pron-/Supinate L 0 1   Toe Tap R 1 0   Toe Tap L 0 0   Leg Agility R 0 0   Leg Agility L 0 0   Arise From Chair 0 0   Gait 1 0   Gait Freezing 0 1    Postural Stability 0 0   Posture 3 1   Global Spont Mvt 2 1   Postural Tremor RUE 0 0   Postural Tremor LUE 1 0   Kinetic Tremor RUE 0 0   Kinetic Tremor LUE 0 0   Rest Tremor RUE 2 1   Rest Tremor LUE 0 0   Rest Tremor RLE 0 0   Rest Tremor LLE 0 0   Rest Tremor Lip/Jaw 0 0   Rest Tremor Constancy 1 1   Total Right 7 6   Total Left 5 5   Axial Total 9 6   Total 22 18       DATA:  MRI Brain (3/7/2023)  IMPRESSION:  Mild cerebral atrophy. The intracranial contents are otherwise negative.   Slight ethmoid paranasal sinus inflammatory change.    ASSESSMENT:  Yamil Smith is a 64yr old R-handed male who presents for follow-up of Parkinson's Disease. Today he is reporting issues mostly with wearing off - around 4hrs after his last dose manifesting with stiffness and some tremor - and also gait freezing. It is unclear if the latter is responsive to CD/LD or not. Still, I would recommend increasing the frequency of the CD/LD dose to Q4h and payign more attention to which symptoms are improved with CD/LD and which are not. I also recommend that he begin PT for his gait and balance. I also provided information to the patient about Orthostatic Hypotension and recommend that his family keep a close eye on both dizziness as well as dream enactment behavior    PLAN:  - Increase Carbidopa/Levodopa 1 tab four times per day (ie every 4 hours). It will look like this:  New Meds 7AM 11AM 3PM 7PM   CD/LD (25-100mg) 1 1 1  1   - PT referral to work on walking/balance  - Try to pay close attention to which of your symptoms respond to Carbidopa/Levodopa and which do not  - Aerobic Exercises are very important for Parkinson's Disease. I would recommend that you try and exercise on a daily basis   - Let us know via a [employer] message if you'd be interested in the PD Exercise groups (ex Rock Steady Boxing, Ping-Pong for Parkinson's, etc) and we can send you some information. Or, you should be able to find it online  - Continue to pay  close attention to acting out your dreams at night as this could be a sign of REM Sleep Behavior Disorder (RBD). If this gets more frequent or severe, let us know and we may start to treat  - Continue to pay close attention to dizziness. I've provided some information on your AVS, but keep us updated as well  - I have also given you information on constipation should you need it  - Follow-up in 6mo    This patient was seen with Movement Disorder Specialist, Dr Singer, who agrees with the above plan    Janeth Jett MD  Movement Disorder Fellow    Attestation signed by Sven Morales MD at 1/2/2025 12:21 PM:  Attestation entered as a separate note.      I, Sven Torres MD, personally interviewed, examined and evaluated this patient. I personally reviewed the vital signs, medications and pertinent labs/imaging. I discussed the patient with Dr Jett and agree with the assessment, examination and plan of care documented, with the additions and/or exceptions delineated below:    Patient comes in today for evaluation and followup  of suspected Parkinson disease.  He seems to be doing well on the current dose of levodopa, however he seems like every time that he goes beyond the 4-hour sparkle his medications trying to wear off, so he came to the plan that we will end up adding another dose of medication so he will stay on an every 4 hours schedule.  No side effects to levodopa to report today.    He is experiencing some nonmotor symptoms which we also addressed particularly REM sleep behavior disorder.    He was encouraged to maintain healthy life habits, exercise regularly.  Will plan on regrouping in the next 3 to 4 months.  Sooner if needed.  Encouraged to contact us back for any questions or concerns      The longitudinal plan of care for Yamil was addressed during this visit. Due to the added complexity in care, I will continue to support Yamil Smith in the subsequent management of this  condition(s) and with the ongoing continuity of care of this condition(s).    Attending attestation: Today a total 45 minutes were spent on this case, in face-to-face, examining, obtaining history, providing education and coordination of care. Additional time was spent in chart review, ancillary data, and documentation as delineated above.      Again, thank you for allowing me to participate in the care of your patient.      Sincerely,    Sven Torres MD

## 2025-01-02 NOTE — PROGRESS NOTES
I, Sven Torres MD, personally interviewed, examined and evaluated this patient. I personally reviewed the vital signs, medications and pertinent labs/imaging. I discussed the patient with Dr Jett and agree with the assessment, examination and plan of care documented, with the additions and/or exceptions delineated below:    Patient comes in today for evaluation and followup  of suspected Parkinson disease.  He seems to be doing well on the current dose of levodopa, however he seems like every time that he goes beyond the 4-hour sparkle his medications trying to wear off, so he came to the plan that we will end up adding another dose of medication so he will stay on an every 4 hours schedule.  No side effects to levodopa to report today.    He is experiencing some nonmotor symptoms which we also addressed particularly REM sleep behavior disorder.    He was encouraged to maintain healthy life habits, exercise regularly.  Will plan on regrouping in the next 3 to 4 months.  Sooner if needed.  Encouraged to contact us back for any questions or concerns      The longitudinal plan of care for Yamil was addressed during this visit. Due to the added complexity in care, I will continue to support Yamil KERN Smith in the subsequent management of this condition(s) and with the ongoing continuity of care of this condition(s).    Attending attestation: Today a total 45 minutes were spent on this case, in face-to-face, examining, obtaining history, providing education and coordination of care. Additional time was spent in chart review, ancillary data, and documentation as delineated above.

## 2025-02-26 ENCOUNTER — THERAPY VISIT (OUTPATIENT)
Dept: PHYSICAL THERAPY | Facility: CLINIC | Age: 65
End: 2025-02-26
Attending: PSYCHIATRY & NEUROLOGY
Payer: COMMERCIAL

## 2025-02-26 DIAGNOSIS — G20.A1 PARKINSON'S DISEASE WITHOUT DYSKINESIA OR FLUCTUATING MANIFESTATIONS (H): ICD-10-CM

## 2025-02-26 PROCEDURE — 97161 PT EVAL LOW COMPLEX 20 MIN: CPT | Mod: GP

## 2025-02-26 PROCEDURE — 97112 NEUROMUSCULAR REEDUCATION: CPT | Mod: GP

## 2025-02-26 NOTE — PROGRESS NOTES
"PHYSICAL THERAPY EVALUATION  Type of Visit: Evaluation        Fall Risk Screen:  Fall screen completed by: PT  Have you fallen 2 or more times in the past year?: Yes  Have you fallen and had an injury in the past year?: No  Timed Up and Go score (seconds): 9.01  Is patient a fall risk?: Yes; Department fall risk interventions implemented    Subjective   Pt presents with Parkinson's Disease. Pt reports his legs have been more tired and sore, and feels like he might \"tip over.\" He is taking Levadopa 4x/day which he believes has helped. Had most recent dose 30 minutes ago. He is working full time, self employed. He reports working has been challenging with moving around throughout his day. He is not using an AD at this time. He goes to the gym and does general strengthening as well as the treadmill/ bike. He has some knee pain when turning. Other symptoms he reports include freezing of gait, falls (usually losing balance forward), R hand tremor, cold sweats, and some difficulty getting into and out of chair. Pt is right handed. Accompanied by his son today.         Presenting condition or subjective complaint: doctor recommendations  Date of onset: 02/11/25    Relevant medical history:     Dates & types of surgery:      Prior diagnostic imaging/testing results:       Prior therapy history for the same diagnosis, illness or injury: No      Prior Level of Function  Transfers: Independent  Ambulation: Independent  ADL: Independent    Living Environment  Social support: With family members   Type of home: House   Stairs to enter the home: Yes 10 Is there a railing: Yes     Ramp: No   Stairs inside the home: Yes 10 Is there a railing: Yes     Help at home:    Equipment owned: [employer]     Employment: Yes [employer]  Hobbies/Interests:      Patient goals for therapy:      Pain assessment:  Some knee pain when pivoting     Objective      Cognitive Status Examination  Orientation: Oriented to person, place and time   Level " of Consciousness: Alert  Follows Commands and Answers Questions: 100% of the time  Personal Safety and Judgement: Intact  Memory: Intact    POSTURE: Sitting Posture: Rounded shoulders, Forward head, Thoracic kyphosis increased  RANGE OF MOTION: LE ROM WNL  UE ROM WNL  STRENGTH: LE Strength WNL  UE Strength WNL  L hip flexor strength 4/5    GAIT:   Level of [place]: Independent  Assistive Device(s): None  Gait Deviations:  Decreased stride length, decreased mariola, decreased arm swing L, minimal to no arm swing R, decreased trunk rotation rosalia, no instances of gait freezing noted today. Bradykinesia and forward posture at trunk.   Stairs: Ascends/ descends reciprocally without railing      SPECIAL TESTS  Functional Gait Assessment (FGA) TOTAL SCORE: (MAXIMUM SCORE 30): 24    5 Times Sit-to-Stand (5TSTS)  11.3 sec   30 Second Sit-to-Stand 11 reps     Timed Up and Go (TUG) - sec Standard - 9.01  Cog - 15.97  Motor - 10.58   Modified CTSIB Conditions (sec) Cond 1: 30  Cond 2: 30  Cond 4: 30  Cond 5 : 10     Assessment & Plan   CLINICAL IMPRESSIONS  Medical Diagnosis: Parkinson's disease without dyskinesia or fluctuating manifestations (H) (G20.A1)    Treatment Diagnosis: Parkinsons   Impression/Assessment: Patient is a 65 year old male with weakness, balance deficits, gait freezing due to Parkinson's.  The following significant findings have been identified: Decreased strength, Impaired balance, and Impaired posture. These impairments interfere with their ability to perform work tasks, recreational activities, household mobility, and community mobility as compared to previous level of function.     Clinical Decision Making (Complexity):  Clinical Presentation: Stable/Uncomplicated  Clinical Presentation Rationale: based on medical and personal factors listed in PT evaluation  Clinical Decision Making (Complexity): Low complexity    PLAN OF CARE  Treatment Interventions:  Modalities: Dry Needling, E-stim,  Ultrasound  Interventions: Gait Training, Manual Therapy, Neuromuscular Re-education, Therapeutic Activity, Therapeutic Exercise, Self-Care/Home Management    Long Term Goals     PT Goal 1  Goal Identifier: 1  Goal Description: Patient will be able to perform STS from all surfaces on first attempt 100% of the time to improve transfers.  Target Date: 04/02/25  PT Goal 2  Goal Identifier: 2  Goal Description: Patient will improve TUG cognitive score to <13.5 sec to demonstrate improvement in dual tasking.  Target Date: 04/02/25  PT Goal 3  Goal Identifier: 3  Goal Description: Patient will be able to successfully implement strategies to reduce freezing of gait when walking to reduce risk for falls.  Target Date: 04/23/25  PT Goal 4  Goal Identifier: 4  Goal Description: Patient will be able to independently ambulate 1 mile or greater with upright posture, bilateral arm swing and no shuffling to improve quality of gait for community mobility.  Target Date: 05/07/25  PT Goal 5  Goal Identifier: 5  Goal Description: Patient will be independent with HEP to self-manage symptoms and transition to gym program for future maintenance of parkinsons related symptoms.  Target Date: 05/07/25      Frequency of Treatment: 1x/week  Duration of Treatment: 10 weeks    Education Assessment:   Learner/Method: Patient;Family;Listening;Reading;Demonstration;Pictures/Video;No Barriers to Learning    Risks and benefits of evaluation/treatment have been explained.   Patient/Family/caregiver agrees with Plan of Care.     Evaluation Time:     PT Eval, Low Complexity Minutes (37056): 45    Signing Clinician: Oksana Lilly PT and Kelley Hinds [facility]                                                                                   OUTPATIENT PHYSICAL THERAPY      PLAN OF TREATMENT FOR OUTPATIENT REHABILITATION   Patient's Last Name, First Name, Yamil Mackenzie YOB: 1960    Provider's Name   [facility]   Medical Record No.  [id number]     Onset Date: 02/11/25  Start of Care Date: 02/26/25     Medical Diagnosis:  Parkinson's disease without dyskinesia or fluctuating manifestations (H) (G20.A1)      PT Treatment Diagnosis:  Parkinsons Plan of Treatment  Frequency/Duration: 1x/week/ 10 weeks    Certification date from 02/26/25 to 05/07/25         See note for plan of treatment details and functional goals     Oksana Lilly, PT                         I CERTIFY THE NEED FOR THESE SERVICES FURNISHED UNDER        THIS PLAN OF TREATMENT AND WHILE UNDER MY CARE     (Physician attestation of this document indicates review and certification of the therapy plan).              Referring Provider:  Sven Torres    Initial Assessment  See Epic Evaluation- Start of Care Date: 02/26/25

## 2025-03-12 ENCOUNTER — THERAPY VISIT (OUTPATIENT)
Dept: PHYSICAL THERAPY | Facility: CLINIC | Age: 65
End: 2025-03-12
Attending: PSYCHIATRY & NEUROLOGY
Payer: COMMERCIAL

## 2025-03-12 DIAGNOSIS — G20.A1 PARKINSON'S DISEASE WITHOUT DYSKINESIA, WITHOUT MENTION OF FLUCTUATIONS (H): Primary | ICD-10-CM

## 2025-03-12 PROCEDURE — 97112 NEUROMUSCULAR REEDUCATION: CPT | Mod: GP

## 2025-03-12 PROCEDURE — 97116 GAIT TRAINING THERAPY: CPT | Mod: GP

## 2025-03-16 ENCOUNTER — HEALTH MAINTENANCE LETTER (OUTPATIENT)
Age: 65
End: 2025-03-16

## 2025-03-26 ENCOUNTER — THERAPY VISIT (OUTPATIENT)
Dept: PHYSICAL THERAPY | Facility: CLINIC | Age: 65
End: 2025-03-26
Attending: PSYCHIATRY & NEUROLOGY
Payer: COMMERCIAL

## 2025-03-26 DIAGNOSIS — G20.A1 PARKINSON'S DISEASE WITHOUT DYSKINESIA, WITHOUT MENTION OF FLUCTUATIONS (H): Primary | ICD-10-CM

## 2025-03-26 PROCEDURE — 97112 NEUROMUSCULAR REEDUCATION: CPT | Mod: GP

## 2025-03-26 PROCEDURE — 97110 THERAPEUTIC EXERCISES: CPT | Mod: GP

## 2025-05-29 ENCOUNTER — OFFICE VISIT (OUTPATIENT)
Dept: NEUROLOGY | Facility: CLINIC | Age: 65
End: 2025-05-29
Payer: COMMERCIAL

## 2025-05-29 VITALS — OXYGEN SATURATION: 98 % | SYSTOLIC BLOOD PRESSURE: 161 MMHG | HEART RATE: 71 BPM | DIASTOLIC BLOOD PRESSURE: 78 MMHG

## 2025-05-29 DIAGNOSIS — G20.A1 PARKINSON'S DISEASE WITHOUT DYSKINESIA OR FLUCTUATING MANIFESTATIONS (H): Primary | ICD-10-CM

## 2025-05-29 PROCEDURE — 99214 OFFICE O/P EST MOD 30 MIN: CPT | Performed by: PSYCHIATRY & NEUROLOGY

## 2025-05-29 PROCEDURE — 3078F DIAST BP <80 MM HG: CPT | Performed by: PSYCHIATRY & NEUROLOGY

## 2025-05-29 PROCEDURE — 3077F SYST BP >= 140 MM HG: CPT | Performed by: PSYCHIATRY & NEUROLOGY

## 2025-05-29 PROCEDURE — G2211 COMPLEX E/M VISIT ADD ON: HCPCS | Performed by: PSYCHIATRY & NEUROLOGY

## 2025-05-29 ASSESSMENT — UNIFIED PARKINSONS DISEASE RATING SCALE (UPDRS)
PRONATION_SUPINATION_RIGHT: (1) SLIGHT: ANY OF THE FOLLOWING: A) THE REGULAR RHYTHM IS BROKEN WITH ONE WITH ONE OR TWO INTERRUPTIONS OR HESITATIONS OF THE MOVEMENT  B) SLIGHT SLOWING  C) THE AMPLITUDE DECREMENTS NEAR THE END OF THE 10 MOVEMENTS.
TOTAL_SCORE: 18
HANDMOVEMENTS_RIGHT: (1) SLIGHT: ANY OF THE FOLLOWING: A) THE REGULAR RHYTHM IS BROKEN WITH ONE WITH ONE OR TWO INTERRUPTIONS OR HESITATIONS OF THE MOVEMENT  B) SLIGHT SLOWING  C) THE AMPLITUDE DECREMENTS NEAR THE END OF THE 10 MOVEMENTS.
FACIAL_EXPRESSION: (2) MILD: IN ADDITION TO DECREASED EYE-BLINK FREQUENCY, MASKED FACIES PRESENT IN THE LOWER FACE AS WELL, NAMELY FEWER MOVEMENTS AROUND THE MOUTH, SUCH AS LESS SPONTANEOUS SMILING, BUT LIPS NOT PARTED.
TOTAL_SCORE: 6
MOVEMENTS_INTERFERE_WITH_RATINGS: NO
TOETAPPING_RIGHT: (0) NORMAL: NO PROBLEMS.
PRONATION_SUPINATION_LEFT: (1) SLIGHT: ANY OF THE FOLLOWING: A) THE REGULAR RHYTHM IS BROKEN WITH ONE WITH ONE OR TWO INTERRUPTIONS OR HESITATIONS OF THE MOVEMENT  B) SLIGHT SLOWING  C) THE AMPLITUDE DECREMENTS NEAR THE END OF THE 10 MOVEMENTS.
RIGIDITY_LLE: (1) SLIGHT: RIGIDITY ONLY DETECTED WITH ACTIVATION MANEUVER.
AMPLITUDE_LIP_JAW: (0) NORMAL: NO TREMOR.
FREEZING_GAIT: (1) SLIGHT: FREEZES ON STARTING, TURNING, OR WALKING THROUGH DOORWAY WITH A SINGLE HALT DURING ANY OF THESE EVENTS, BUT THEN CONTINUES SMOOTHLY WITHOUT FREEZING DURING STRAIGHT WALKING.
SPEECH: (1) SLIGHT: LOSS OF MODULATION, DICTION OR VOLUME, BUT STILL ALL WORDS EASY TO UNDERSTAND.
AMPLITUDE_RUE: (1) SLIGHT: < 1 CM IN MAXIMAL AMPLITUDE.
LEG_AGILITY_RIGHT: (0) NORMAL: NO PROBLEMS.
DYSKINESIAS_PRESENT: NO
TOTAL_SCORE_LEFT: 5
TOETAPPING_LEFT: (0) NORMAL: NO PROBLEMS.
AMPLITUDE_LLE: (0) NORMAL: NO TREMOR.
FINGER_TAPPING_RIGHT: (1) SLIGHT: ANY OF THE FOLLOWING: A) THE REGULAR RHYTHM IS BROKEN WITH ONE WITH ONE OR TWO INTERRUPTIONS OR HESITATIONS OF THE MOVEMENT  B) SLIGHT SLOWING  C) THE AMPLITUDE DECREMENTS NEAR THE END OF THE 10 MOVEMENTS.
FINGER_TAPPING_LEFT: (1) SLIGHT: ANY OF THE FOLLOWING: A) THE REGULAR RHYTHM IS BROKEN WITH ONE WITH ONE OR TWO INTERRUPTIONS OR HESITATIONS OF THE MOVEMENT  B) SLIGHT SLOWING  C) THE AMPLITUDE DECREMENTS NEAR THE END OF THE 10 MOVEMENTS.
LEG_AGILITY_LEFT: (0) NORMAL: NO PROBLEMS.
ARISING_CHAIR: (0) NORMAL: NO PROBLEMS. ABLE TO ARISE QUICKLY WITHOUT HESITATION.
AXIAL_SCORE: 6
RIGIDITY_LUE: (1) SLIGHT: RIGIDITY ONLY DETECTED WITH ACTIVATION MANEUVER.
RIGIDITY_NECK: (0) NORMAL: NO RIGIDITY.
POSTURAL_STABILITY: (0) NORMAL: NO PROBLEMS. RECOVERS WITH ONE OR TWO STEPS.
RIGIDITY_RUE: (1) SLIGHT: RIGIDITY ONLY DETECTED WITH ACTIVATION MANEUVER.
HANDMOVEMENTS_LEFT: (1) SLIGHT: ANY OF THE FOLLOWING: A) THE REGULAR RHYTHM IS BROKEN WITH ONE WITH ONE OR TWO INTERRUPTIONS OR HESITATIONS OF THE MOVEMENT  B) SLIGHT SLOWING  C) THE AMPLITUDE DECREMENTS NEAR THE END OF THE 10 MOVEMENTS.
SPONTANEITY_OF_MOVEMENT: (1) SLIGHT: SLIGHT GLOBAL SLOWNESS AND POVERTY OF SPONTANEOUS MOVEMENTS.
AMPLITUDE_RLE: (0) NORMAL: NO TREMOR.
PARKINSONS_MEDS: ON
CONSTANCY_TREMOR_ATREST: (1) SLIGHT: TREMOR AT REST IS PRESENT 25% OF THE ENTIRE EXAMINATION PERIOD.
POSTURE: (1) SLIGHT: NOT QUITE ERECT, BUT COULD BE NORMAL FOR OLDER PERSONS.
GAIT: (0) NORMAL: NO PROBLEMS.
RIGIDITY_RLE: (1) SLIGHT: RIGIDITY ONLY DETECTED WITH ACTIVATION MANEUVER.
AMPLITUDE_LUE: (0) NORMAL: NO TREMOR.

## 2025-05-29 NOTE — NURSING NOTE
Chief Complaint   Patient presents with    RECHECK     Return movement disorder     Moody Matias

## 2025-05-29 NOTE — LETTER
2025       RE: Yamil Smith  [address] [address]     Dear Colleague,    Thank you for referring your patient, Yamil Smith, to the [facility] NEUROLOGY CLINIC [place] at [facility]. Please see a copy of my visit note below.    Department of Neurology  Movement Disorders Division   Return Patient Visit    Patient: Yamil Smith   MRN: [id number]   : 1960   Date of Visit: 2025  PCP: Richard Ohara MD   Referring provider: Self    CC:  PD return    Interval history:  Mr. Smith is a 65 year old male with history significant for past history of Parkinson disease who presents to movement disorder clinic for follow-up. Last visit was December of last year, with a plan to continue meds and supportive care. He is accompanied by his son, who assists with collateral history.    Overall doing well, no interval changes concerning for disease progression.  He does not seem to be very active, he is not exercising as much, and he does seem to have occasional balance problems that do not seem to be associated with any presyncopal symptoms, nor associated with medication states.    He feels that his current dose of levodopa is appropriate for symptom control, he does not appreciate any wearing off or dyskinesias.  He does not appreciate any other side effects including GI or cognitive complications.    Otherwise he is doing okay and denies any other issues to be addressed today.      ROS:  All others negative except as listed above. Pertinent movement disorders-specific ROS listed above.    No past medical history on file.     No past surgical history on file.       Current Outpatient Medications:      acetaminophen (TYLENOL) 500 MG tablet, Take 1,000 mg by mouth every 6 hours as needed, Disp: , Rfl:      carbidopa-levodopa (SINEMET)  MG tablet, Take 1 tablet by mouth 5 times daily. Take 1 tab at 7AM, 11AM, 3PM,  and 7PM (Q4h). You may take another pill as needed, Disp: 450 tablet, Rfl: 3     Multiple Vitamins-Minerals (SUPER THERA COLLIN M) TABS, Take 1 tablet by mouth daily, Disp: , Rfl:      No Known Allergies     No family history on file.     Social History:  He  reports that he has never smoked. His smokeless tobacco use includes chew.     PHYSICAL EXAM:  BP (!) 161/78 (BP Location: Right arm, Patient Position: Sitting, Cuff Size: Adult Large)   Pulse 71   SpO2 98%      NEURO:      12/26/2024     8:00 AM   UPDRS Motor Scale   Time: 08:52   Medication On   R Brain DBS: None   L Brain DBS: None   Dyskinesia (LID) No   Speech 1   Facial Expression 2   Rigidity Neck 0   Rigidity RUE 1   Rigidity LUE 1   Rigidity RLE 1   Rigidity LLE 1   Finger Taps R 1   Finger Taps L 1   Hand Mvt R 1   Hand Mvt L 1   Pron-/Supinate R 1   Pron-/Supinate L 1   Toe Tap R 0   Toe Tap L 0   Leg Agility R 0   Leg Agility L 0   Arise From Chair 0   Gait 0   Gait Freezing 1   Postural Stability 0   Posture 1   Global Spont Mvt 1   Postural Tremor RUE 0   Postural Tremor LUE 0   Kinetic Tremor RUE 0   Kinetic Tremor LUE 0   Rest Tremor RUE 1   Rest Tremor LUE 0   Rest Tremor RLE 0   Rest Tremor LLE 0   Rest Tremor Lip/Jaw 0   Rest Tremor Constancy 1   Total Right 6   Total Left 5   Axial Total 6   Total 18         DATA REVIEWED:  Reviewed pertinent data in epic.    ASSESSMENT:  65-year-old male with Parkinson's disease, here for follow-up.  No interval changes.    PLAN:  -Reviewed impression and plan with patient and family    - Continue levodopa at the same dosage.    - Encouraged to exercise much as tolerated.  We did talk about physical therapy, the son asked to go ahead and place a referral today, however the patient did not seem very much committed to go to therapy appointments at this point, so we decided to hold off and if he changes his mind he can always send a [employer] message and we will place that accordingly to make sure that the  referral is current and will .    - Other than that we will continue supportive care and following him along.  Next visit rescan the next 4 to 6 months.  Sooner if needed.  Encouraged to contact me back if any questions or concerns in the meantime.    There are no Patient Instructions on file for this visit.      Sven Singer MD (Leo) (he/him/his)   of Neurology  [facility]  Department of Neurology      Thank you for referring Yamil Smith to our [facility] Movement Disorders Program, we appreciate the opportunity of being your partner in healthcare. Please feel free to reach out to us at any point if any questions or concerns about this visit.    Attending attestation: Today I spent a total 30 minutes on this case, in face-to-face, examining, obtaining history, providing education and coordination of care. Additional time was spent in chart review, ancillary data, and documentation as delineated above.    The longitudinal plan of care for Yamil was addressed during this visit. Due to the added complexity in care, I will continue to support Yamil Smith in the subsequent management of this condition(s) and with the ongoing continuity of care of this condition(s).    Again, thank you for allowing me to participate in the care of your patient.      Sincerely,    Sven Torres MD

## 2025-06-17 NOTE — PROGRESS NOTES
Department of Neurology  Movement Disorders Division   Return Patient Visit    Patient: Yamil Smith   MRN: [id number]   : 1960   Date of Visit: 2025  PCP: Richard Ohara MD   Referring provider: Self    CC:  PD return    Interval history:  Mr. Smith is a 65 year old male with history significant for past history of Parkinson disease who presents to movement disorder clinic for follow-up. Last visit was December of last year, with a plan to continue meds and supportive care. He is accompanied by his son, who assists with collateral history.    Overall doing well, no interval changes concerning for disease progression.  He does not seem to be very active, he is not exercising as much, and he does seem to have occasional balance problems that do not seem to be associated with any presyncopal symptoms, nor associated with medication states.    He feels that his current dose of levodopa is appropriate for symptom control, he does not appreciate any wearing off or dyskinesias.  He does not appreciate any other side effects including GI or cognitive complications.    Otherwise he is doing okay and denies any other issues to be addressed today.      ROS:  All others negative except as listed above. Pertinent movement disorders-specific ROS listed above.    No past medical history on file.     No past surgical history on file.       Current Outpatient Medications:     acetaminophen (TYLENOL) 500 MG tablet, Take 1,000 mg by mouth every 6 hours as needed, Disp: , Rfl:     carbidopa-levodopa (SINEMET)  MG tablet, Take 1 tablet by mouth 5 times daily. Take 1 tab at 7AM, 11AM, 3PM, and 7PM (Q4h). You may take another pill as needed, Disp: 450 tablet, Rfl: 3    Multiple Vitamins-Minerals (SUPER THERA COLLIN M) TABS, Take 1 tablet by mouth daily, Disp: , Rfl:      No Known Allergies     No family history on file.     Social History:  He  reports that he has never smoked. His smokeless tobacco use  includes chew.     PHYSICAL EXAM:  BP (!) 161/78 (BP Location: Right arm, Patient Position: Sitting, Cuff Size: Adult Large)   Pulse 71   SpO2 98%      NEURO:      2024     8:00 AM   UPDRS Motor Scale   Time: 08:52   Medication On   R Brain DBS: None   L Brain DBS: None   Dyskinesia (LID) No   Speech 1   Facial Expression 2   Rigidity Neck 0   Rigidity RUE 1   Rigidity LUE 1   Rigidity RLE 1   Rigidity LLE 1   Finger Taps R 1   Finger Taps L 1   Hand Mvt R 1   Hand Mvt L 1   Pron-/Supinate R 1   Pron-/Supinate L 1   Toe Tap R 0   Toe Tap L 0   Leg Agility R 0   Leg Agility L 0   Arise From Chair 0   Gait 0   Gait Freezing 1   Postural Stability 0   Posture 1   Global Spont Mvt 1   Postural Tremor RUE 0   Postural Tremor LUE 0   Kinetic Tremor RUE 0   Kinetic Tremor LUE 0   Rest Tremor RUE 1   Rest Tremor LUE 0   Rest Tremor RLE 0   Rest Tremor LLE 0   Rest Tremor Lip/Jaw 0   Rest Tremor Constancy 1   Total Right 6   Total Left 5   Axial Total 6   Total 18         DATA REVIEWED:  Reviewed pertinent data in epic.    ASSESSMENT:  65-year-old male with Parkinson's disease, here for follow-up.  No interval changes.    PLAN:  -Reviewed impression and plan with patient and family    - Continue levodopa at the same dosage.    - Encouraged to exercise much as tolerated.  We did talk about physical therapy, the son asked to go ahead and place a referral today, however the patient did not seem very much committed to go to therapy appointments at this point, so we decided to hold off and if he changes his mind he can always send a [facility] message and we will place that accordingly to make sure that the referral is current and will .    - Other than that we will continue supportive care and following him along.  Next visit rescan the next 4 to 6 months.  Sooner if needed.  Encouraged to contact me back if any questions or concerns in the meantime.    There are no Patient Instructions on file for this  visit.      Sven Singer MD (Leo) (he/him/his)   of Neurology  [facility]  Department of Neurology      Thank you for referring Yamil Smith to our [facility] Movement Disorders Program, we appreciate the opportunity of being your partner in healthcare. Please feel free to reach out to us at any point if any questions or concerns about this visit.    Attending attestation: Today I spent a total 30 minutes on this case, in face-to-face, examining, obtaining history, providing education and coordination of care. Additional time was spent in chart review, ancillary data, and documentation as delineated above.    The longitudinal plan of care for Yamil was addressed during this visit. Due to the added complexity in care, I will continue to support Yamil Smith in the subsequent management of this condition(s) and with the ongoing continuity of care of this condition(s).